# Patient Record
Sex: FEMALE | Race: WHITE | NOT HISPANIC OR LATINO | Employment: FULL TIME | URBAN - METROPOLITAN AREA
[De-identification: names, ages, dates, MRNs, and addresses within clinical notes are randomized per-mention and may not be internally consistent; named-entity substitution may affect disease eponyms.]

---

## 2018-02-03 ENCOUNTER — OFFICE VISIT (OUTPATIENT)
Dept: URGENT CARE | Facility: MEDICAL CENTER | Age: 23
End: 2018-02-03
Payer: COMMERCIAL

## 2018-02-03 VITALS
RESPIRATION RATE: 20 BRPM | BODY MASS INDEX: 18.33 KG/M2 | TEMPERATURE: 99.1 F | HEART RATE: 88 BPM | SYSTOLIC BLOOD PRESSURE: 116 MMHG | OXYGEN SATURATION: 99 % | DIASTOLIC BLOOD PRESSURE: 70 MMHG | WEIGHT: 99.6 LBS | HEIGHT: 62 IN

## 2018-02-03 DIAGNOSIS — K52.9 GASTROENTERITIS: Primary | ICD-10-CM

## 2018-02-03 PROCEDURE — 99213 OFFICE O/P EST LOW 20 MIN: CPT | Performed by: PHYSICIAN ASSISTANT

## 2018-02-03 PROCEDURE — G0382 LEV 3 HOSP TYPE B ED VISIT: HCPCS | Performed by: PHYSICIAN ASSISTANT

## 2018-02-03 RX ORDER — ONDANSETRON 4 MG/1
4 TABLET, ORALLY DISINTEGRATING ORAL EVERY 6 HOURS PRN
Qty: 20 TABLET | Refills: 0 | Status: SHIPPED | OUTPATIENT
Start: 2018-02-03 | End: 2018-10-14 | Stop reason: ALTCHOICE

## 2018-02-03 NOTE — PROGRESS NOTES
Assessment/Plan:    Patient Instructions     Zofran as needed for vomiting and nausea  Imodium as needed for diarrhea  Gastroenteritis   WHAT YOU NEED TO KNOW:   What is gastroenteritis? Gastroenteritis, or stomach flu, is an infection of the stomach and intestines  It is caused by bacteria, parasites, or viruses  What increases my risk for gastroenteritis? · Close contact with an infected person or animal    · Food poisoning, such as from eggs, raw vegetables, shellfish, or meat that is not fully cooked    · Drinking water that is not clean, such as when you camp or travel  What are the signs and symptoms of gastroenteritis? · Diarrhea or gas    · Nausea, vomiting, or poor appetite    · Abdominal cramps, pain, or gurgling    · Fever    · Tiredness or weakness    · Headaches or muscle aches with any of the above symptoms  How is gastroenteritis diagnosed and treated? Your healthcare provider will examine you  He will check for signs of dehydration  He will ask you how often you are vomiting or have diarrhea  You may need a blood or bowel movement sample tested for the germ causing your gastroenteritis  Gastroenteritis often clears up on its own  Medicines may be given to slow or stop your diarrhea or vomiting  You may also need medicines to treat an infection caused by bacteria or a parasite  How can I manage my gastroenteritis? · Drink liquids as directed  Ask your healthcare provider how much liquid to drink each day, and which liquids are best for you  You may also need to drink an oral rehydration solution (ORS)  An ORS has the right amounts of sugar, salt, and minerals in water to replace body fluids  · Eat bland foods  When you feel hungry, begin eating soft, bland foods  Examples are bananas, clear soup, potatoes, and applesauce  Do not have dairy products, alcohol, sugary drinks, or drinks with caffeine until you feel better  · Rest as much as possible    Slowly start to do more each day when you begin to feel better  How can I prevent gastroenteritis? Gastroenteritis can spread easily  Keep yourself, your family, and your surroundings clean to help prevent the spread of gastroenteritis:  · Wash your hands often  Use soap and water  Wash your hands after you use the bathroom, change a child's diapers, or sneeze  Wash your hands before you prepare or eat food  · Clean surfaces and do laundry often  Wash your clothes and towels separately from the rest of the laundry  Clean surfaces in your home with antibacterial  or bleach  · Clean food thoroughly and cook safely  Wash raw vegetables before you cook  Cook meat, fish, and eggs fully  Do not use the same dishes for raw meat as you do for other foods  Refrigerate any leftover food immediately  · Be aware when you camp or travel  Drink only clean water  Do not drink from rivers or lakes unless you purify or boil the water first  When you travel, drink bottled water and do not add ice  Do not eat fruit that has not been peeled  Do not eat raw fish or meat that is not fully cooked  Call 911 for any of the following:   · You have trouble breathing or a very fast pulse  When should I seek immediate care? · You see blood in your diarrhea  · You cannot stop vomiting  · You have not urinated for 12 hours  · You feel like you are going to faint  When should I contact my healthcare provider? · You have a fever  · You continue to vomit or have diarrhea, even after treatment  · You see worms in your diarrhea  · Your mouth or eyes are dry  You are not urinating as much or as often  · You have questions or concerns about your condition or care  CARE AGREEMENT:   You have the right to help plan your care  Learn about your health condition and how it may be treated  Discuss treatment options with your caregivers to decide what care you want to receive  You always have the right to refuse treatment  The above information is an  only  It is not intended as medical advice for individual conditions or treatments  Talk to your doctor, nurse or pharmacist before following any medical regimen to see if it is safe and effective for you  © 2017 [employer] Information is for End User's use only and may not be sold, redistributed or otherwise used for commercial purposes  All illustrations and images included in CareNotes® are the copyrighted property of [employer]  or [employer]  Diagnoses and all orders for this visit:    Gastroenteritis  -     ondansetron (ZOFRAN-ODT) 4 mg disintegrating tablet; Take 1 tablet (4 mg total) by mouth every 6 (six) hours as needed for nausea or vomiting          Subjective:      Patient ID: Varinder June 20 y o  female      Patient presents with nausea, vomiting, diarrhea and fever since last night  She states yesterday from 4:00 p m  till midnight she had severe nausea and vomiting every hour  That resolved around midnight and then she began with diarrhea and had that until early this morning  Now she is complaining of nausea but is getting her appetite back  She denies any diarrhea or vomiting in the last several hours  She has not eaten today but was able to keep down small amount of fluids  Vomiting    This is a new problem  The current episode started yesterday  The problem occurs 5 to 10 times per day  The problem has been gradually improving  The maximum temperature recorded prior to her arrival was 101 - 101 9 F  Associated symptoms include abdominal pain, arthralgias, chills, diarrhea, a fever, headaches and myalgias  Pertinent negatives include no chest pain, coughing, dizziness or sweats  Risk factors include suspect food intake  She has tried diet change and bed rest for the symptoms  The treatment provided mild relief     Nausea   Associated symptoms include abdominal pain, arthralgias, chills, a fever, headaches, myalgias, nausea and vomiting  Pertinent negatives include no chest pain or coughing  The following portions of the patient's history were reviewed and updated as appropriate: allergies, current medications, past medical history, past social history, past surgical history and problem list     Review of Systems   Constitutional: Positive for chills and fever  Respiratory: Negative for cough  Cardiovascular: Negative for chest pain  Gastrointestinal: Positive for abdominal pain, diarrhea, nausea and vomiting  Musculoskeletal: Positive for arthralgias and myalgias  Neurological: Positive for headaches  Negative for dizziness  Objective:    Physical Exam   Constitutional: She appears well-developed and well-nourished  No distress  HENT:   Right Ear: Tympanic membrane, external ear and ear canal normal    Left Ear: Tympanic membrane, external ear and ear canal normal    Nose: Nose normal  No mucosal edema or rhinorrhea  Mouth/Throat: Oropharynx is clear and moist  No oropharyngeal exudate, posterior oropharyngeal edema, posterior oropharyngeal erythema or tonsillar abscesses  Eyes: Conjunctivae are normal    Cardiovascular: Normal rate, regular rhythm and normal heart sounds  Pulmonary/Chest: Effort normal and breath sounds normal    Abdominal: Soft  Bowel sounds are increased  There is generalized tenderness  Lymphadenopathy:        Head (right side): No tonsillar adenopathy present  Head (left side): No tonsillar adenopathy present  Nursing note and vitals reviewed        Vitals:    02/03/18 1517   BP: 116/70   BP Location: Right arm   Patient Position: Supine   Cuff Size: Adult   Pulse: 88   Resp: 20   Temp: 99 1 °F (37 3 °C)   TempSrc: Tympanic   SpO2: 99%   Weight: 45 2 kg (99 lb 9 6 oz)   Height: 5' 2" (1 575 m)

## 2018-02-03 NOTE — PATIENT INSTRUCTIONS
Zofran as needed for vomiting and nausea  Imodium as needed for diarrhea  Gastroenteritis   WHAT YOU NEED TO KNOW:   What is gastroenteritis? Gastroenteritis, or stomach flu, is an infection of the stomach and intestines  It is caused by bacteria, parasites, or viruses  What increases my risk for gastroenteritis? · Close contact with an infected person or animal    · Food poisoning, such as from eggs, raw vegetables, shellfish, or meat that is not fully cooked    · Drinking water that is not clean, such as when you camp or travel  What are the signs and symptoms of gastroenteritis? · Diarrhea or gas    · Nausea, vomiting, or poor appetite    · Abdominal cramps, pain, or gurgling    · Fever    · Tiredness or weakness    · Headaches or muscle aches with any of the above symptoms  How is gastroenteritis diagnosed and treated? Your healthcare provider will examine you  He will check for signs of dehydration  He will ask you how often you are vomiting or have diarrhea  You may need a blood or bowel movement sample tested for the germ causing your gastroenteritis  Gastroenteritis often clears up on its own  Medicines may be given to slow or stop your diarrhea or vomiting  You may also need medicines to treat an infection caused by bacteria or a parasite  How can I manage my gastroenteritis? · Drink liquids as directed  Ask your healthcare provider how much liquid to drink each day, and which liquids are best for you  You may also need to drink an oral rehydration solution (ORS)  An ORS has the right amounts of sugar, salt, and minerals in water to replace body fluids  · Eat bland foods  When you feel hungry, begin eating soft, bland foods  Examples are bananas, clear soup, potatoes, and applesauce  Do not have dairy products, alcohol, sugary drinks, or drinks with caffeine until you feel better  · Rest as much as possible  Slowly start to do more each day when you begin to feel better    How can I prevent gastroenteritis? Gastroenteritis can spread easily  Keep yourself, your family, and your surroundings clean to help prevent the spread of gastroenteritis:  · Wash your hands often  Use soap and water  Wash your hands after you use the bathroom, change a child's diapers, or sneeze  Wash your hands before you prepare or eat food  · Clean surfaces and do laundry often  Wash your clothes and towels separately from the rest of the laundry  Clean surfaces in your home with antibacterial  or bleach  · Clean food thoroughly and cook safely  Wash raw vegetables before you cook  Cook meat, fish, and eggs fully  Do not use the same dishes for raw meat as you do for other foods  Refrigerate any leftover food immediately  · Be aware when you camp or travel  Drink only clean water  Do not drink from rivers or lakes unless you purify or boil the water first  When you travel, drink bottled water and do not add ice  Do not eat fruit that has not been peeled  Do not eat raw fish or meat that is not fully cooked  Call 911 for any of the following:   · You have trouble breathing or a very fast pulse  When should I seek immediate care? · You see blood in your diarrhea  · You cannot stop vomiting  · You have not urinated for 12 hours  · You feel like you are going to faint  When should I contact my healthcare provider? · You have a fever  · You continue to vomit or have diarrhea, even after treatment  · You see worms in your diarrhea  · Your mouth or eyes are dry  You are not urinating as much or as often  · You have questions or concerns about your condition or care  CARE AGREEMENT:   You have the right to help plan your care  Learn about your health condition and how it may be treated  Discuss treatment options with your caregivers to decide what care you want to receive  You always have the right to refuse treatment   The above information is an  only  It is not intended as medical advice for individual conditions or treatments  Talk to your doctor, nurse or pharmacist before following any medical regimen to see if it is safe and effective for you  © 2017 [facility] Information is for End User's use only and may not be sold, redistributed or otherwise used for commercial purposes  All illustrations and images included in CareNotes® are the copyrighted property of [employer]  or O[employer] Monteiro

## 2018-10-14 ENCOUNTER — OFFICE VISIT (OUTPATIENT)
Dept: URGENT CARE | Facility: MEDICAL CENTER | Age: 23
End: 2018-10-14
Payer: COMMERCIAL

## 2018-10-14 VITALS
WEIGHT: 106 LBS | RESPIRATION RATE: 20 BRPM | HEART RATE: 138 BPM | BODY MASS INDEX: 20.01 KG/M2 | OXYGEN SATURATION: 98 % | SYSTOLIC BLOOD PRESSURE: 122 MMHG | HEIGHT: 61 IN | DIASTOLIC BLOOD PRESSURE: 74 MMHG | TEMPERATURE: 101.4 F

## 2018-10-14 DIAGNOSIS — J02.9 SORE THROAT: ICD-10-CM

## 2018-10-14 DIAGNOSIS — J02.0 STREP THROAT: Primary | ICD-10-CM

## 2018-10-14 LAB — S PYO AG THROAT QL: POSITIVE

## 2018-10-14 PROCEDURE — G0382 LEV 3 HOSP TYPE B ED VISIT: HCPCS | Performed by: FAMILY MEDICINE

## 2018-10-14 PROCEDURE — 87430 STREP A AG IA: CPT | Performed by: FAMILY MEDICINE

## 2018-10-14 RX ORDER — AZITHROMYCIN 250 MG/1
TABLET, FILM COATED ORAL
Qty: 6 TABLET | Refills: 0 | Status: SHIPPED | OUTPATIENT
Start: 2018-10-14 | End: 2018-10-18

## 2018-10-14 NOTE — PATIENT INSTRUCTIONS
Rapid strep test-positive  Patient placed on Zithromax and xylocaine viscous for sore throat  May continue with Tylenol or Motrin as needed for fever and body aches  Pharyngitis   WHAT YOU NEED TO KNOW:   Pharyngitis, or sore throat, is inflammation of the tissues and structures in your pharynx (throat)  Pharyngitis is most often caused by bacteria  It may also be caused by a cold or flu virus  Other causes include smoking, allergies, or acid reflux  DISCHARGE INSTRUCTIONS:   Call 911 for any of the following:   · You have trouble breathing or swallowing because your throat is swollen or sore  Return to the emergency department if:   · You are drooling because it hurts too much to swallow  · Your fever is higher than 102? F (39?C) or lasts longer than 3 days  · You are confused  · You taste blood in your throat  Contact your healthcare provider if:   · Your throat pain gets worse  · You have a painful lump in your throat that does not go away after 5 days  · Your symptoms do not improve after 5 days  · You have questions or concerns about your condition or care  Medicines:  Viral pharyngitis will go away on its own without treatment  Your sore throat should start to feel better in 3 to 5 days for both viral and bacterial infections  You may need any of the following:  · Antibiotics  treat a bacterial infection  · NSAIDs , such as ibuprofen, help decrease swelling, pain, and fever  NSAIDs can cause stomach bleeding or kidney problems in certain people  If you take blood thinner medicine, always ask your healthcare provider if NSAIDs are safe for you  Always read the medicine label and follow directions  · Acetaminophen  decreases pain and fever  It is available without a doctor's order  Ask how much to take and how often to take it  Follow directions  Acetaminophen can cause liver damage if not taken correctly  · Take your medicine as directed    Contact your healthcare provider if you think your medicine is not helping or if you have side effects  Tell him or her if you are allergic to any medicine  Keep a list of the medicines, vitamins, and herbs you take  Include the amounts, and when and why you take them  Bring the list or the pill bottles to follow-up visits  Carry your medicine list with you in case of an emergency  Manage your symptoms:   · Gargle salt water  Mix ¼ teaspoon salt in an 8 ounce glass of warm water and gargle  This may help decrease swelling in your throat  · Drink liquids as directed  You may need to drink more liquids than usual  Liquids may help soothe your throat and prevent dehydration  Ask how much liquid to drink each day and which liquids are best for you  · Use a cool-steam humidifier  to help moisten the air in your room and calm your cough  · Soothe your throat  with cough drops, ice, soft foods, or popsicles  Prevent the spread of pharyngitis:  Cover your mouth and nose when you cough or sneeze  Do not share food or drinks  Wash your hands often  Use soap and water  If soap and water are unavailable, use an alcohol based hand   Follow up with your healthcare provider as directed:  Write down your questions so you remember to ask them during your visits  © 2017 [employer] Information is for End User's use only and may not be sold, redistributed or otherwise used for commercial purposes  All illustrations and images included in CareNotes® are the copyrighted property of [employer]  or [employer] Monte[employer]  The above information is an  only  It is not intended as medical advice for individual conditions or treatments  Talk to your doctor, nurse or pharmacist before following any medical regimen to see if it is safe and effective for you

## 2018-10-14 NOTE — LETTER
October 14, 2018     Patient: Igor Li   YOB: 1995   Date of Visit: 10/14/2018       To Whom It May Concern: It is my medical opinion that Igor Li may return to work on 10/16/2018  If you have any questions or concerns, please don't hesitate to call           Sincerely,        Sonali Franco MD    CC: No Recipients

## 2018-10-14 NOTE — PROGRESS NOTES
[facility]        NAME: Pb Michele is a 24 y o  female  : 1995    MRN: [id number]  DATE: 2018  TIME: 5:01 PM    Assessment and Plan   Strep throat [J02 0]  1  Strep throat  azithromycin (ZITHROMAX) 250 mg tablet    lidocaine viscous (XYLOCAINE) 2 % mucosal solution   2  Sore throat  POCT rapid strepA         Patient Instructions       Follow up with PCP in 3-5 days  Proceed to  ER if symptoms worsen  Chief Complaint     Chief Complaint   Patient presents with    Fever     Pt with symptoms starting 2 days ago  Fever since yesterday  temp today 102 8  Took advil cold and sinus at 1230 today  Complaining of severe sore throat, left worse than right with left neck swelling and pain   Headache    Sore Throat    Fatigue    Earache         History of Present Illness       Patient complaining of 2 day history of sore throat which is severe nature  Also complaining of bilateral earache fluctuating at times  However symptoms progressively worsened yesterday  Accompanied by severe body aches  She has been taking Advil cold and Sinus with no noticeable improvement  Also complains of severe headache since yesterday and fever 102 8 today  Refers to exposure to her friend about a week ago  Review of Systems   Review of Systems   Constitutional: Positive for fatigue and fever  HENT: Positive for congestion and sore throat  Musculoskeletal: Positive for back pain and neck pain  Neurological: Positive for headaches           Current Medications       Current Outpatient Prescriptions:     azithromycin (ZITHROMAX) 250 mg tablet, Take 2 tablets today then 1 tablet daily x 4 days, Disp: 6 tablet, Rfl: 0    lidocaine viscous (XYLOCAINE) 2 % mucosal solution, Swish and spit 15 mL 4 (four) times a day as needed for moderate pain, Disp: 100 mL, Rfl: 0    Current Allergies     Allergies as of 10/14/2018 - Reviewed 10/14/2018   Allergen Reaction Noted    Penicillins 02/01/2017            The following portions of the patient's history were reviewed and updated as appropriate: allergies, current medications, past family history, past medical history, past social history, past surgical history and problem list      No past medical history on file  No past surgical history on file  No family history on file  Medications have been verified  Objective   /74 (BP Location: Right arm, Patient Position: Sitting, Cuff Size: Standard)   Pulse (!) 138   Temp (!) 101 4 °F (38 6 °C) (Tympanic)   Resp 20   Ht 5' 1" (1 549 m)   Wt 48 1 kg (106 lb)   SpO2 98%   BMI 20 03 kg/m²        Physical Exam     Physical Exam   HENT:   Right Ear: External ear normal    Left Ear: External ear normal    Hypertrophic erythematous left tonsil  No exudates visualized  Cardiovascular: Normal rate and regular rhythm  Pulmonary/Chest: Effort normal and breath sounds normal    Lymphadenopathy:     She has cervical adenopathy  Nursing note and vitals reviewed

## 2019-02-08 ENCOUNTER — OFFICE VISIT (OUTPATIENT)
Dept: URGENT CARE | Facility: MEDICAL CENTER | Age: 24
End: 2019-02-08
Payer: COMMERCIAL

## 2019-02-08 VITALS
HEART RATE: 66 BPM | SYSTOLIC BLOOD PRESSURE: 108 MMHG | WEIGHT: 108 LBS | TEMPERATURE: 97.9 F | HEIGHT: 61 IN | BODY MASS INDEX: 20.39 KG/M2 | OXYGEN SATURATION: 98 % | RESPIRATION RATE: 16 BRPM | DIASTOLIC BLOOD PRESSURE: 60 MMHG

## 2019-02-08 DIAGNOSIS — H66.005 RECURRENT ACUTE SUPPURATIVE OTITIS MEDIA WITHOUT SPONTANEOUS RUPTURE OF LEFT TYMPANIC MEMBRANE: Primary | ICD-10-CM

## 2019-02-08 DIAGNOSIS — J01.00 ACUTE NON-RECURRENT MAXILLARY SINUSITIS: ICD-10-CM

## 2019-02-08 PROCEDURE — G0381 LEV 2 HOSP TYPE B ED VISIT: HCPCS | Performed by: PHYSICIAN ASSISTANT

## 2019-02-08 RX ORDER — FLUTICASONE PROPIONATE 50 MCG
2 SPRAY, SUSPENSION (ML) NASAL DAILY
Qty: 16 G | Refills: 0 | Status: SHIPPED | OUTPATIENT
Start: 2019-02-08

## 2019-02-08 RX ORDER — SULFAMETHOXAZOLE AND TRIMETHOPRIM 800; 160 MG/1; MG/1
1 TABLET ORAL EVERY 12 HOURS SCHEDULED
Qty: 20 TABLET | Refills: 0 | Status: SHIPPED | OUTPATIENT
Start: 2019-02-08 | End: 2019-02-19 | Stop reason: HOSPADM

## 2019-02-08 NOTE — PATIENT INSTRUCTIONS

## 2019-02-08 NOTE — PROGRESS NOTES
[facility]        NAME: Marni Bertrand is a 24 y o  female  : 1995    MRN: [id number]  DATE: 2019  TIME: 2:02 PM    Assessment and Plan   Recurrent acute suppurative otitis media without spontaneous rupture of left tympanic membrane [H66 005]  1  Recurrent acute suppurative otitis media without spontaneous rupture of left tympanic membrane  sulfamethoxazole-trimethoprim (BACTRIM DS) 800-160 mg per tablet    fluticasone (FLONASE) 50 mcg/act nasal spray   2  Acute non-recurrent maxillary sinusitis           Patient Instructions       Follow up with PCP in 3-5 days  Proceed to  ER if symptoms worsen  Chief Complaint     Chief Complaint   Patient presents with    Cold Like Symptoms     Patient here with "runny nose" and a cough for 5 days  History of Present Illness       Sinusitis   This is a new problem  The current episode started 1 to 4 weeks ago  The problem has been gradually worsening since onset  The maximum temperature recorded prior to her arrival was 100 4 - 100 9 F  Associated symptoms include congestion, coughing, ear pain (Left ear), headaches, a hoarse voice, sinus pressure, a sore throat and swollen glands  Pertinent negatives include no chills, diaphoresis, neck pain, shortness of breath or sneezing  Past treatments include acetaminophen and saline sprays  The treatment provided mild relief  Review of Systems   Review of Systems   Constitutional: Negative for chills and diaphoresis  HENT: Positive for congestion, ear pain (Left ear), hoarse voice, sinus pressure and sore throat  Negative for sneezing  Respiratory: Positive for cough  Negative for shortness of breath  Musculoskeletal: Negative for neck pain  Neurological: Positive for headaches  All other systems reviewed and are negative          Current Medications       Current Outpatient Prescriptions:     fluticasone (FLONASE) 50 mcg/act nasal spray, 2 sprays into each nostril daily, Disp: 16 g, Rfl: 0    lidocaine viscous (XYLOCAINE) 2 % mucosal solution, Swish and spit 15 mL 4 (four) times a day as needed for moderate pain (Patient not taking: Reported on 2/8/2019 ), Disp: 100 mL, Rfl: 0    sulfamethoxazole-trimethoprim (BACTRIM DS) 800-160 mg per tablet, Take 1 tablet by mouth every 12 (twelve) hours for 10 days, Disp: 20 tablet, Rfl: 0    Current Allergies     Allergies as of 02/08/2019 - Reviewed 02/08/2019   Allergen Reaction Noted    Erythromycin Other (See Comments) and Rash 12/12/2018    Penicillins  02/01/2017            The following portions of the patient's history were reviewed and updated as appropriate: allergies, current medications, past family history, past medical history, past social history, past surgical history and problem list      No past medical history on file  No past surgical history on file  No family history on file  Medications have been verified  Objective   /60 (BP Location: Left arm, Patient Position: Sitting, Cuff Size: Standard)   Pulse 66   Temp 97 9 °F (36 6 °C) (Tympanic)   Resp 16   Ht 5' 1" (1 549 m)   Wt 49 kg (108 lb)   LMP 01/15/2019 (Exact Date)   SpO2 98%   BMI 20 41 kg/m²        Physical Exam     Physical Exam   Constitutional: She appears well-developed and well-nourished  HENT:   Right Ear: Hearing, external ear and ear canal normal  Tympanic membrane is bulging  Left Ear: Hearing, external ear and ear canal normal  Tympanic membrane is erythematous and bulging  Nose: Mucosal edema and rhinorrhea present  Right sinus exhibits maxillary sinus tenderness  Right sinus exhibits no frontal sinus tenderness  Left sinus exhibits maxillary sinus tenderness  Left sinus exhibits no frontal sinus tenderness  Mouth/Throat: Posterior oropharyngeal erythema present  Cardiovascular: Normal rate, regular rhythm and normal heart sounds      Pulmonary/Chest: Effort normal and breath sounds normal  Lymphadenopathy:     She has cervical adenopathy (Left anterior)  Nursing note and vitals reviewed

## 2019-02-19 ENCOUNTER — OFFICE VISIT (OUTPATIENT)
Dept: URGENT CARE | Facility: MEDICAL CENTER | Age: 24
End: 2019-02-19
Payer: COMMERCIAL

## 2019-02-19 VITALS
RESPIRATION RATE: 16 BRPM | BODY MASS INDEX: 20.73 KG/M2 | TEMPERATURE: 99.7 F | DIASTOLIC BLOOD PRESSURE: 71 MMHG | SYSTOLIC BLOOD PRESSURE: 128 MMHG | WEIGHT: 109.8 LBS | HEART RATE: 82 BPM | HEIGHT: 61 IN | OXYGEN SATURATION: 100 %

## 2019-02-19 DIAGNOSIS — J20.8 ACUTE BACTERIAL BRONCHITIS: ICD-10-CM

## 2019-02-19 DIAGNOSIS — R21 RASH: Primary | ICD-10-CM

## 2019-02-19 DIAGNOSIS — B96.89 ACUTE BACTERIAL BRONCHITIS: ICD-10-CM

## 2019-02-19 PROCEDURE — G0383 LEV 4 HOSP TYPE B ED VISIT: HCPCS | Performed by: FAMILY MEDICINE

## 2019-02-19 RX ORDER — DOXYCYCLINE HYCLATE 100 MG/1
100 CAPSULE ORAL EVERY 12 HOURS SCHEDULED
Qty: 20 CAPSULE | Refills: 0 | Status: SHIPPED | OUTPATIENT
Start: 2019-02-19 | End: 2019-03-01

## 2019-02-19 RX ORDER — PREDNISONE 10 MG/1
TABLET ORAL
Qty: 32 TABLET | Refills: 0 | Status: SHIPPED | OUTPATIENT
Start: 2019-02-19

## 2019-02-19 NOTE — PROGRESS NOTES
[facility]        NAME: Analisa Espinal is a 24 y o  female  : 1995    MRN: [id number]  DATE: 2019  TIME: 8:59 AM    Assessment and Plan   Rash [R21]  1  Rash  predniSONE 10 mg tablet   2  Acute bacterial bronchitis  doxycycline hyclate (VIBRAMYCIN) 100 mg capsule         Patient Instructions       Follow up with PCP in 3-5 days  Proceed to  ER if symptoms worsen  Chief Complaint     Chief Complaint   Patient presents with    Rash     Patient relates started with a rash this morning to arms, legs, back, hands and feet  States, her lips feel swollen  Denies SOB  Denies throat swelling  She was seen here on 19 and prescribed Bactrim DS for ear infection  Patient thinks rash is related to Bactrim  Temperature 99 4 max at home  History of Present Illness        67-year-old female who presents with a rash that started this morning  She was started on Bactrim last week for her sinusitis and ear infection  She notes that the ear pain has resolved however her cough is still persistent  The cough started nearly 3 weeks ago  She denies any fevers or chills   the rash has improved on her upper extremities however is still full minute on her lower extremities      He denies any swelling of her oral mucosa or shortness of breath or wheezing       Review of Systems   Review of Systems  As above     Current Medications       Current Outpatient Medications:     fluticasone (FLONASE) 50 mcg/act nasal spray, 2 sprays into each nostril daily, Disp: 16 g, Rfl: 0    doxycycline hyclate (VIBRAMYCIN) 100 mg capsule, Take 1 capsule (100 mg total) by mouth every 12 (twelve) hours for 10 days, Disp: 20 capsule, Rfl: 0    lidocaine viscous (XYLOCAINE) 2 % mucosal solution, Swish and spit 15 mL 4 (four) times a day as needed for moderate pain (Patient not taking: Reported on 2019 ), Disp: 100 mL, Rfl: 0    predniSONE 10 mg tablet, Take 5 tablets for 2 days,, Disp: 32 tablet, Rfl: 0    Current Allergies     Allergies as of 02/19/2019 - Reviewed 02/19/2019   Allergen Reaction Noted    Erythromycin Other (See Comments) and Rash 12/12/2018    Penicillins  02/01/2017    Bactrim ds [sulfamethoxazole-trimethoprim] Rash 02/19/2019            The following portions of the patient's history were reviewed and updated as appropriate: allergies, current medications, past family history, past medical history, past social history, past surgical history and problem list      History reviewed  No pertinent past medical history  History reviewed  No pertinent surgical history  No family history on file  Medications have been verified  Objective   /71   Pulse 82   Temp 99 7 °F (37 6 °C) (Tympanic)   Resp 16   Ht 5' 1" (1 549 m)   Wt 49 8 kg (109 lb 12 8 oz)   LMP 01/15/2019 (Exact Date)   SpO2 100%   BMI 20 75 kg/m²        Physical Exam     Physical Exam   Constitutional: She is oriented to person, place, and time  She appears well-developed and well-nourished  HENT:   Head: Normocephalic and atraumatic  Mouth/Throat: Oropharynx is clear and moist    Eyes: Conjunctivae are normal    Neck: Neck supple  Cardiovascular: Normal rate, regular rhythm and normal heart sounds  Pulmonary/Chest: Effort normal and breath sounds normal  No respiratory distress  She has no wheezes  She has no rales  Abdominal: Soft  Musculoskeletal: Normal range of motion  Neurological: She is alert and oriented to person, place, and time  Skin: Skin is warm and dry  Rash (  Maculopapular rash present on her upper and lower extremities) noted  Psychiatric: She has a normal mood and affect  Her behavior is normal    Nursing note and vitals reviewed

## 2019-05-07 ENCOUNTER — OFFICE VISIT (OUTPATIENT)
Dept: URGENT CARE | Facility: MEDICAL CENTER | Age: 24
End: 2019-05-07
Payer: COMMERCIAL

## 2019-05-07 VITALS
HEART RATE: 69 BPM | DIASTOLIC BLOOD PRESSURE: 65 MMHG | WEIGHT: 110 LBS | TEMPERATURE: 97.1 F | BODY MASS INDEX: 20.77 KG/M2 | HEIGHT: 61 IN | SYSTOLIC BLOOD PRESSURE: 112 MMHG

## 2019-05-07 DIAGNOSIS — J02.8 PHARYNGITIS DUE TO OTHER ORGANISM: Primary | ICD-10-CM

## 2019-05-07 LAB — S PYO AG THROAT QL: NEGATIVE

## 2019-05-07 PROCEDURE — 87070 CULTURE OTHR SPECIMN AEROBIC: CPT | Performed by: PHYSICIAN ASSISTANT

## 2019-05-07 PROCEDURE — 87147 CULTURE TYPE IMMUNOLOGIC: CPT | Performed by: PHYSICIAN ASSISTANT

## 2019-05-07 PROCEDURE — G0382 LEV 3 HOSP TYPE B ED VISIT: HCPCS | Performed by: PHYSICIAN ASSISTANT

## 2019-05-07 PROCEDURE — 87430 STREP A AG IA: CPT | Performed by: PHYSICIAN ASSISTANT

## 2019-05-08 ENCOUNTER — TELEPHONE (OUTPATIENT)
Dept: URGENT CARE | Facility: MEDICAL CENTER | Age: 24
End: 2019-05-08

## 2019-05-08 DIAGNOSIS — J02.0 STREP THROAT: Primary | ICD-10-CM

## 2019-05-08 LAB — BACTERIA THROAT CULT: ABNORMAL

## 2019-05-08 RX ORDER — AMOXICILLIN 500 MG/1
1000 CAPSULE ORAL EVERY 24 HOURS
Qty: 20 CAPSULE | Refills: 0 | Status: SHIPPED | OUTPATIENT
Start: 2019-05-08 | End: 2019-05-18